# Patient Record
Sex: FEMALE | Race: OTHER | URBAN - METROPOLITAN AREA
[De-identification: names, ages, dates, MRNs, and addresses within clinical notes are randomized per-mention and may not be internally consistent; named-entity substitution may affect disease eponyms.]

---

## 2018-01-31 ENCOUNTER — IMPORTED ENCOUNTER (OUTPATIENT)
Dept: URBAN - METROPOLITAN AREA CLINIC 50 | Facility: CLINIC | Age: 58
End: 2018-01-31

## 2018-03-13 ENCOUNTER — IMPORTED ENCOUNTER (OUTPATIENT)
Dept: URBAN - METROPOLITAN AREA CLINIC 50 | Facility: CLINIC | Age: 58
End: 2018-03-13

## 2018-06-11 ENCOUNTER — IMPORTED ENCOUNTER (OUTPATIENT)
Dept: URBAN - METROPOLITAN AREA CLINIC 50 | Facility: CLINIC | Age: 58
End: 2018-06-11

## 2018-11-14 ENCOUNTER — IMPORTED ENCOUNTER (OUTPATIENT)
Dept: URBAN - METROPOLITAN AREA CLINIC 50 | Facility: CLINIC | Age: 58
End: 2018-11-14

## 2018-11-14 NOTE — PATIENT DISCUSSION
"""Type 2 Diabetic eye exam with dilation. No diabetic retinopathy found. Recommend annual dilated examinations. Patient instructed to call office immediately if sudden changes in vision occur. Emphasized importance of good blood glucose control. Summary of care provided to the physician managing the ongoing diabetes care. """

## 2019-10-30 ENCOUNTER — IMPORTED ENCOUNTER (OUTPATIENT)
Dept: URBAN - METROPOLITAN AREA CLINIC 50 | Facility: CLINIC | Age: 59
End: 2019-10-30

## 2020-01-22 ENCOUNTER — IMPORTED ENCOUNTER (OUTPATIENT)
Dept: URBAN - METROPOLITAN AREA CLINIC 50 | Facility: CLINIC | Age: 60
End: 2020-01-22

## 2020-07-27 NOTE — PATIENT DISCUSSION
Updated GLRx and CL Trials given today. Pt to try trials and follow with John Carlson for further CL changes.

## 2020-08-07 NOTE — PATIENT DISCUSSION
Updated GLRx and CL Trials given today. Pt to try trials and follow with Judith Comp for further CL changes.

## 2020-08-07 NOTE — PATIENT DISCUSSION
Updated GLRx and CL Trials given today. Pt to try trials and follow with Iliana Later for further CL changes.

## 2021-01-14 ENCOUNTER — IMPORTED ENCOUNTER (OUTPATIENT)
Dept: URBAN - METROPOLITAN AREA CLINIC 50 | Facility: CLINIC | Age: 61
End: 2021-01-14

## 2021-04-17 ASSESSMENT — VISUAL ACUITY
OD_CC: J1+
OD_SC: 20/25-2
OS_OTHER: 20/20. 20/20.
OD_CC: 20/20
OD_OTHER: 20/25.
OD_CC: J1+
OS_PH: @ 16 IN
OS_CC: J1+@ 16 IN
OS_OTHER: 20/25.
OS_CC: 20/20
OS_CC: J1+
OD_BAT: 20/20
OD_CC: J1+
OS_SC: 20/25
OD_PH: @ 16 IN
OD_OTHER: 20/20. 20/20.
OS_BAT: 20/20
OS_CC: J1+
OD_CC: J1+@ 16 IN
OS_CC: J1+
OD_SC: 20/25
OS_SC: 20/25-2

## 2021-04-17 ASSESSMENT — TONOMETRY
OD_IOP_MMHG: 18
OS_IOP_MMHG: 16
OS_IOP_MMHG: 18
OS_IOP_MMHG: 18
OD_IOP_MMHG: 18
OD_IOP_MMHG: 17

## 2022-02-04 ENCOUNTER — PREPPED CHART (OUTPATIENT)
Dept: URBAN - METROPOLITAN AREA CLINIC 52 | Facility: CLINIC | Age: 62
End: 2022-02-04

## 2022-02-10 ENCOUNTER — COMPREHENSIVE EXAM (OUTPATIENT)
Dept: URBAN - METROPOLITAN AREA CLINIC 52 | Facility: CLINIC | Age: 62
End: 2022-02-10

## 2022-02-10 DIAGNOSIS — Z79.4: ICD-10-CM

## 2022-02-10 DIAGNOSIS — E11.9: ICD-10-CM

## 2022-02-10 DIAGNOSIS — H25.813: ICD-10-CM

## 2022-02-10 PROCEDURE — 92014 COMPRE OPH EXAM EST PT 1/>: CPT

## 2022-02-10 PROCEDURE — 92015 DETERMINE REFRACTIVE STATE: CPT

## 2022-02-10 PROCEDURE — 92134 CPTRZ OPH DX IMG PST SGM RTA: CPT

## 2022-02-10 ASSESSMENT — VISUAL ACUITY
OS_GLARE: 20/30
OD_GLARE: 20/30
OD_PH: 20/25
OS_SC: 20/25
OS_GLARE: 20/40
OD_GLARE: 20/25
OD_SC: 20/30
OU_CC: J1-2

## 2022-02-10 ASSESSMENT — TONOMETRY
OD_IOP_MMHG: 18
OS_IOP_MMHG: 18

## 2022-08-08 ENCOUNTER — EMERGENCY VISIT (OUTPATIENT)
Dept: URBAN - METROPOLITAN AREA CLINIC 50 | Facility: CLINIC | Age: 62
End: 2022-08-08

## 2022-08-08 VITALS — DIASTOLIC BLOOD PRESSURE: 78 MMHG | SYSTOLIC BLOOD PRESSURE: 117 MMHG | HEIGHT: 55 IN

## 2022-08-08 DIAGNOSIS — H00.024: ICD-10-CM

## 2022-08-08 PROCEDURE — 92012 INTRM OPH EXAM EST PATIENT: CPT

## 2022-08-08 RX ORDER — TOBRAMYCIN AND DEXAMETHASONE 3; 1 MG/G; MG/G: OINTMENT OPHTHALMIC TWICE A DAY

## 2022-08-08 RX ORDER — CEPHALEXIN 500 MG/1: 1 CAPSULE ORAL TWICE A DAY

## 2022-08-08 ASSESSMENT — TONOMETRY
OD_IOP_MMHG: 19
OS_IOP_MMHG: 18

## 2022-08-08 ASSESSMENT — VISUAL ACUITY
OS_SC: 20/25+1
OU_SC: 20/20
OD_SC: 20/20-2

## 2022-08-08 NOTE — PATIENT DISCUSSION
Explained that due to the location and her discomfort we will not flip her lid. Discussed that we can just use warm compresses or we can do warm compresses and a steroid antibiotic ointment. We also have the option of an oral antibiotic. We can do just one or all of them depending on how she is interested in using. She would like the fastest resolution. Will start her on Keflex (cephalexin) 500mg by mouth twice a day and warm compresses twice a day. Will also have her use Tobradex ointment on the eye lid.

## 2023-07-27 NOTE — PATIENT DISCUSSION
RTC in 1 year for CL Exam, sooner if problems or changes. L1 vertebral fracture L1 vertebral fracture

## 2023-11-07 ENCOUNTER — EMERGENCY VISIT (OUTPATIENT)
Dept: URBAN - METROPOLITAN AREA CLINIC 52 | Facility: CLINIC | Age: 63
End: 2023-11-07

## 2023-11-07 DIAGNOSIS — H01.134: ICD-10-CM

## 2023-11-07 DIAGNOSIS — H01.131: ICD-10-CM

## 2023-11-07 PROCEDURE — 92012 INTRM OPH EXAM EST PATIENT: CPT

## 2023-11-07 RX ORDER — ERYTHROMYCIN 5 MG/G
OINTMENT OPHTHALMIC EVERY EVENING
Start: 2023-11-07

## 2023-11-07 ASSESSMENT — VISUAL ACUITY
OS_SC: 20/20
OU_SC: 20/20
OD_SC: 20/20

## 2023-11-07 ASSESSMENT — TONOMETRY
OD_IOP_MMHG: 19
OS_IOP_MMHG: 18

## 2024-04-06 ENCOUNTER — COMPREHENSIVE EXAM (OUTPATIENT)
Dept: URBAN - METROPOLITAN AREA CLINIC 52 | Facility: CLINIC | Age: 64
End: 2024-04-06

## 2024-04-06 DIAGNOSIS — H25.813: ICD-10-CM

## 2024-04-06 DIAGNOSIS — H01.134: ICD-10-CM

## 2024-04-06 DIAGNOSIS — H16.223: ICD-10-CM

## 2024-04-06 DIAGNOSIS — H52.4: ICD-10-CM

## 2024-04-06 DIAGNOSIS — E11.9: ICD-10-CM

## 2024-04-06 DIAGNOSIS — Z79.4: ICD-10-CM

## 2024-04-06 DIAGNOSIS — H01.131: ICD-10-CM

## 2024-04-06 PROCEDURE — 99214 OFFICE O/P EST MOD 30 MIN: CPT

## 2024-04-06 PROCEDURE — 92015 DETERMINE REFRACTIVE STATE: CPT

## 2024-04-06 ASSESSMENT — KERATOMETRY
OS_K2POWER_DIOPTERS: 43.25
OD_K1POWER_DIOPTERS: 42.75
OD_AXISANGLE_DEGREES: 174
OS_AXISANGLE_DEGREES: 180
OD_K2POWER_DIOPTERS: 43.50
OD_AXISANGLE2_DEGREES: 84
OS_K1POWER_DIOPTERS: 42.50
OS_AXISANGLE2_DEGREES: 90

## 2024-04-06 ASSESSMENT — VISUAL ACUITY
OD_GLARE: 20/25
OS_GLARE: 20/30
OU_CC: J1+
OD_GLARE: 20/25
OU_SC: 20/20
OD_SC: 20/20
OS_GLARE: 20/30
OS_SC: 20/20-2

## 2024-04-06 ASSESSMENT — TONOMETRY
OD_IOP_MMHG: 17
OS_IOP_MMHG: 17

## 2024-10-01 ENCOUNTER — EMERGENCY VISIT (OUTPATIENT)
Dept: URBAN - METROPOLITAN AREA CLINIC 50 | Facility: LOCATION | Age: 64
End: 2024-10-01

## 2024-10-01 DIAGNOSIS — H00.14: ICD-10-CM

## 2024-10-01 PROCEDURE — 99213 OFFICE O/P EST LOW 20 MIN: CPT

## 2024-10-01 RX ORDER — NEOMYCIN SULFATE, POLYMYXIN B SULFATE AND DEXAMETHASONE 3.5; 10000; 1 MG/G; [USP'U]/G; MG/G
OINTMENT OPHTHALMIC TWICE A DAY
Start: 2024-10-01

## 2024-10-01 RX ORDER — DOXYCYCLINE HYCLATE 100MG 100 MG/1: 1 CAPSULE ORAL TWICE A DAY

## 2024-11-20 ENCOUNTER — FOLLOW UP (OUTPATIENT)
Dept: URBAN - METROPOLITAN AREA CLINIC 52 | Facility: CLINIC | Age: 64
End: 2024-11-20

## 2024-11-20 DIAGNOSIS — H00.14: ICD-10-CM

## 2024-11-20 PROCEDURE — 99213 OFFICE O/P EST LOW 20 MIN: CPT

## 2024-11-20 RX ORDER — TOBRAMYCIN / DEXAMETHASONE 3; .5 MG/ML; MG/ML
1 SUSPENSION/ DROPS OPHTHALMIC
Start: 2024-11-20

## 2024-12-12 ENCOUNTER — FOLLOW UP (OUTPATIENT)
Age: 64
End: 2024-12-12

## 2024-12-12 DIAGNOSIS — H00.14: ICD-10-CM

## 2024-12-12 PROCEDURE — 92012 INTRM OPH EXAM EST PATIENT: CPT

## 2025-05-16 ENCOUNTER — FOLLOW UP (OUTPATIENT)
Age: 65
End: 2025-05-16

## 2025-05-16 DIAGNOSIS — H00.022: ICD-10-CM

## 2025-05-16 PROCEDURE — 99213 OFFICE O/P EST LOW 20 MIN: CPT

## 2025-07-08 ENCOUNTER — COMPREHENSIVE EXAM (OUTPATIENT)
Age: 65
End: 2025-07-08

## 2025-07-08 DIAGNOSIS — H16.223: ICD-10-CM

## 2025-07-08 DIAGNOSIS — H11.32: ICD-10-CM

## 2025-07-08 DIAGNOSIS — E11.9: ICD-10-CM

## 2025-07-08 DIAGNOSIS — H25.813: ICD-10-CM

## 2025-07-08 PROCEDURE — 99214 OFFICE O/P EST MOD 30 MIN: CPT
